# Patient Record
Sex: FEMALE | Race: WHITE | NOT HISPANIC OR LATINO | Employment: UNEMPLOYED | URBAN - NONMETROPOLITAN AREA
[De-identification: names, ages, dates, MRNs, and addresses within clinical notes are randomized per-mention and may not be internally consistent; named-entity substitution may affect disease eponyms.]

---

## 2017-01-24 ENCOUNTER — OFFICE VISIT (OUTPATIENT)
Dept: CARDIOLOGY | Facility: CLINIC | Age: 52
End: 2017-01-24

## 2017-01-24 VITALS
DIASTOLIC BLOOD PRESSURE: 60 MMHG | OXYGEN SATURATION: 99 % | HEART RATE: 62 BPM | WEIGHT: 137 LBS | BODY MASS INDEX: 22.82 KG/M2 | RESPIRATION RATE: 16 BRPM | SYSTOLIC BLOOD PRESSURE: 108 MMHG | HEIGHT: 65 IN

## 2017-01-24 DIAGNOSIS — R00.2 PALPITATIONS: ICD-10-CM

## 2017-01-24 DIAGNOSIS — I10 ESSENTIAL HYPERTENSION: Primary | ICD-10-CM

## 2017-01-24 PROCEDURE — 99212 OFFICE O/P EST SF 10 MIN: CPT | Performed by: INTERNAL MEDICINE

## 2017-01-24 NOTE — MR AVS SNAPSHOT
Radha Smyth   1/24/2017 1:45 PM   Office Visit    Dept Phone:  [phone]   Encounter #:  [id number]    Provider:  Keith Carnes MD   Department:  [facility] CARDIOLOGY                Your Full Care Plan              Today's Medication Changes          These changes are accurate as of: 1/24/17  2:27 PM.  If you have any questions, ask your nurse or doctor.               Stop taking medication(s)listed here:     ciprofloxacin 500 MG tablet   Commonly known as:  CIPRO   Stopped by:  Keith Carnes MD                      Your Updated Medication List          This list is accurate as of: 1/24/17  2:27 PM.  Always use your most recent med list.                ALLEGRA-D ALLERGY & CONGESTION 180-240 MG per 24 hr tablet   Generic drug:  fexofenadine-pseudoephedrine       aspirin 81 MG EC tablet       chlorthalidone 25 MG tablet   Commonly known as:  HYGROTON   Take 0.5 tablets by mouth Daily.       citalopram 40 MG tablet   Commonly known as:  CeleXA       clonazePAM 0.5 MG tablet   Commonly known as:  KlonoPIN       cyclobenzaprine 10 MG tablet   Commonly known as:  FLEXERIL       DULoxetine 30 MG capsule   Commonly known as:  CYMBALTA       ibuprofen 800 MG tablet   Commonly known as:  ADVIL,MOTRIN       metoprolol tartrate 25 MG tablet   Commonly known as:  LOPRESSOR       montelukast 10 MG tablet   Commonly known as:  SINGULAIR       nitroglycerin 0.4 MG SL tablet   Commonly known as:  NITROSTAT       omeprazole 20 MG capsule   Commonly known as:  priLOSEC       promethazine 25 MG tablet   Commonly known as:  PHENERGAN       STOOL SOFTENER 100 MG capsule   Generic drug:  docusate sodium       terbinafine 250 MG tablet   Commonly known as:  lamiSIL               You Were Diagnosed With        Codes Comments    Essential hypertension    -  Primary ICD-10-CM: I10  ICD-9-CM: 401.9     Palpitations     ICD-10-CM: R00.2  ICD-9-CM: 785.1       Instructions     None    "Patient Instructions History      Upcoming Appointments     Visit Type Date Time Department    FOLLOW UP 2017  1:45 PM [facility] [place]      [facility] Signup     [facility] allows you to send messages to your doctor, view your test results, renew your prescriptions, schedule appointments, and more. To sign up, go to [employer] and click on the Sign Up Now link in the New User? box. Enter your [facility] Activation Code exactly as it appears below along with the last four digits of your Social Security Number and your Date of Birth () to complete the sign-up process. If you do not sign up before the expiration date, you must request a new code.    [facility] Activation Code: [email or web address]  Expires: 2017  2:27 PM    If you have questions, you can email [email or web address] or call [phone] to talk to our [facility] staff. Remember, [facility] is NOT to be used for urgent needs. For medical emergencies, dial 911.               Other Info from Your Visit           Allergies     Codeine      Morphine And Related      Penicillins      Sulfa Antibiotics      Tetracyclines & Related        Reason for Visit     Follow-up 6 week testing    Hypertension     Chest Pain     Palpitations           Vital Signs     Blood Pressure Pulse Respirations Height Weight Oxygen Saturation    108/60 (BP Location: Right arm, Patient Position: Sitting, Cuff Size: Adult) 62 16 65\" (165.1 cm) 137 lb (62.1 kg) 99%    Body Mass Index Smoking Status                22.8 kg/m2 Former Smoker          Problems and Diagnoses Noted     High blood pressure    Palpitations        "

## 2017-01-24 NOTE — PROGRESS NOTES
Subjective:     Encounter Date:01/24/2017      Patient ID: Radha Smyth is a 51 y.o. female.    Chief Complaint: Routine follow-up  HPI  This is a 51-year-old female patient who recently underwent Holter monitoring for palpitations.  The patient had one questionable episode of supraventricular tachycardia which was more likely an episode of sinus tachycardia to 126 bpm.  The patient had no significant ventricular or supraventricular arrhythmia.  There was no correlation to symptoms.  There was no bradycardia.  There was no significant atrial or ventricular ectopy.  The patient indicates that her palpitations have subsequently resolved.  She has no chest pain or shortness of breath.  There is no exertional chest arm neck jaw shoulder or back discomfort.  We will obtain her records from [facility] which showed a prior vasodilator nuclear stress test with no inducible ischemia and a calculated ejection fraction of 57%.  In addition the patient had an echocardiogram performed which showed mild concentric left ventricular hypertrophy with abnormal left ventricular relaxation pattern consistent with diastolic dysfunction and hypertensive cardiac disease.  There were no significant valvular abnormalities, pericardial effusion or evidence of secondary pulmonary hypertension.  She denies shortness of breath.  There is no orthopnea PND or lower extremity edema.  She has no dizziness palpitations or syncope.  She is a nonsmoker.  The remainder of her past medical history family history and social history remains unchanged.  The following portions of the patient's history were reviewed and updated as appropriate: allergies, current medications, past family history, past medical history, past social history, past surgical history and problem  Review of Systems   Constitution: Negative for chills, diaphoresis, fever, weakness, malaise/fatigue, weight gain and weight loss.   HENT: Negative for ear  discharge, hearing loss, hoarse voice and nosebleeds.    Eyes: Negative for discharge, double vision, pain and photophobia.   Cardiovascular: Negative for chest pain, claudication, cyanosis, dyspnea on exertion, irregular heartbeat, leg swelling, near-syncope, orthopnea, palpitations, paroxysmal nocturnal dyspnea and syncope.   Respiratory: Negative for cough, hemoptysis, shortness of breath, sputum production and wheezing.    Endocrine: Negative for cold intolerance, heat intolerance, polydipsia, polyphagia and polyuria.   Hematologic/Lymphatic: Negative for adenopathy and bleeding problem. Does not bruise/bleed easily.   Skin: Negative for color change, flushing, itching and rash.   Musculoskeletal: Negative for muscle cramps, muscle weakness, myalgias and stiffness.   Gastrointestinal: Negative for abdominal pain, diarrhea, hematemesis, hematochezia, nausea and vomiting.   Genitourinary: Negative for dysuria, frequency and nocturia.   Neurological: Negative for focal weakness, loss of balance, numbness, paresthesias and seizures.   Psychiatric/Behavioral: Negative for altered mental status, hallucinations and suicidal ideas.   Allergic/Immunologic: Negative for HIV exposure, hives and persistent infections.       Current Outpatient Prescriptions:   •  ALLEGRA-D ALLERGY & CONGESTION 180-240 MG per 24 hr tablet, QD, Disp: , Rfl: 0  •  aspirin 81 MG EC tablet, Take 81 mg by mouth Daily., Disp: , Rfl:   •  chlorthalidone (HYGROTON) 25 MG tablet, Take 0.5 tablets by mouth Daily., Disp: 15 tablet, Rfl: 11  •  citalopram (CeleXA) 40 MG tablet, Daily., Disp: , Rfl: 2  •  clonazePAM (KlonoPIN) 0.5 MG tablet, 2 (Two) Times a Day., Disp: , Rfl: 0  •  cyclobenzaprine (FLEXERIL) 10 MG tablet, TID, Disp: , Rfl: 0  •  DULoxetine (CYMBALTA) 30 MG capsule, Daily., Disp: , Rfl:   •  ibuprofen (ADVIL,MOTRIN) 800 MG tablet, 3 (Three) Times a Day., Disp: , Rfl:   •  metoprolol tartrate (LOPRESSOR) 25 MG tablet, 2 (Two) Times a Day.,  "Disp: , Rfl: 0  •  montelukast (SINGULAIR) 10 MG tablet, Take 10 mg by mouth Every Night., Disp: , Rfl:   •  nitroglycerin (NITROSTAT) 0.4 MG SL tablet, As Needed., Disp: , Rfl: 1  •  omeprazole (priLOSEC) 20 MG capsule, 2 (Two) Times a Day., Disp: , Rfl:   •  promethazine (PHENERGAN) 25 MG tablet, Take 25 mg by mouth 3 (Three) Times a Day., Disp: , Rfl:   •  STOOL SOFTENER 100 MG capsule, Take 100 mg by mouth 2 (Two) Times a Day., Disp: , Rfl:   •  terbinafine (lamiSIL) 250 MG tablet, Daily., Disp: , Rfl:      Objective:     Physical Exam   Constitutional: She is oriented to person, place, and time. She appears well-developed and well-nourished.   HENT:   Head: Normocephalic and atraumatic.   Mouth/Throat: Oropharynx is clear and moist.   Eyes: Conjunctivae and EOM are normal. Pupils are equal, round, and reactive to light. No scleral icterus.   Neck: Normal range of motion. Neck supple. No JVD present. No tracheal deviation present. No thyromegaly present.   Cardiovascular: Normal rate, regular rhythm, S1 normal, S2 normal, normal heart sounds, intact distal pulses and normal pulses.  PMI is not displaced.  Exam reveals no gallop and no friction rub.    No murmur heard.  Pulmonary/Chest: Effort normal and breath sounds normal. No respiratory distress. She has no wheezes. She has no rales.   Abdominal: Soft. Bowel sounds are normal. She exhibits no distension and no mass. There is no tenderness. There is no rebound and no guarding.   Musculoskeletal: Normal range of motion. She exhibits no edema or deformity.   Neurological: She is alert and oriented to person, place, and time. She displays normal reflexes. No cranial nerve deficit. Coordination normal.   Skin: Skin is warm and dry. No rash noted. No erythema.   Psychiatric: She has a normal mood and affect. Her behavior is normal. Thought content normal.     Blood pressure 108/60, pulse 62, resp. rate 16, height 65\" (165.1 cm), weight 137 lb (62.1 kg), SpO2 99 %. "   Lab Review:       Assessment:         1. Essential hypertension    Good Blood pressure control.    2. Palpitations  No evidence of significant arrhythmia.  Her palpitations have subsequently resolved without specific intervention.  Procedures     Plan:       I have recommended that she use her diuretic therapy on an as-needed basis for swelling and/or worsening shortness of breath.  No additional cardiovascular testing is indicated.  She will follow-up in our office on an as-needed basis.